# Patient Record
Sex: FEMALE | Employment: OTHER | URBAN - METROPOLITAN AREA
[De-identification: names, ages, dates, MRNs, and addresses within clinical notes are randomized per-mention and may not be internally consistent; named-entity substitution may affect disease eponyms.]

---

## 2018-05-13 ENCOUNTER — OFFICE VISIT (OUTPATIENT)
Dept: URGENT CARE | Facility: CLINIC | Age: 68
End: 2018-05-13
Payer: MEDICARE

## 2018-05-13 VITALS
OXYGEN SATURATION: 96 % | HEIGHT: 64 IN | WEIGHT: 142 LBS | DIASTOLIC BLOOD PRESSURE: 81 MMHG | TEMPERATURE: 97 F | BODY MASS INDEX: 24.24 KG/M2 | RESPIRATION RATE: 16 BRPM | HEART RATE: 72 BPM | SYSTOLIC BLOOD PRESSURE: 127 MMHG

## 2018-05-13 DIAGNOSIS — K04.7 DENTAL ABSCESS: Primary | ICD-10-CM

## 2018-05-13 PROCEDURE — 99203 OFFICE O/P NEW LOW 30 MIN: CPT | Mod: S$GLB,,, | Performed by: NURSE PRACTITIONER

## 2018-05-13 RX ORDER — PANTOPRAZOLE SODIUM 40 MG/1
40 TABLET, DELAYED RELEASE ORAL DAILY
COMMUNITY

## 2018-05-13 RX ORDER — HYDROCODONE BITARTRATE AND ACETAMINOPHEN 5; 325 MG/1; MG/1
1 TABLET ORAL EVERY 4 HOURS PRN
Qty: 20 TABLET | Refills: 0 | Status: SHIPPED | OUTPATIENT
Start: 2018-05-13

## 2018-05-13 RX ORDER — AMOXICILLIN AND CLAVULANATE POTASSIUM 875; 125 MG/1; MG/1
1 TABLET, FILM COATED ORAL 2 TIMES DAILY
Qty: 20 TABLET | Refills: 0 | Status: SHIPPED | OUTPATIENT
Start: 2018-05-13 | End: 2018-05-23

## 2018-05-13 NOTE — PROGRESS NOTES
"Subjective:       Patient ID: Allyssa Jackson is a 68 y.o. female.    Vitals:  height is 5' 4" (1.626 m) and weight is 64.4 kg (142 lb). Her temperature is 97.3 °F (36.3 °C). Her blood pressure is 127/81 and her pulse is 72. Her respiration is 16 and oxygen saturation is 96%.     Chief Complaint: Dental Pain    Pt had a tooth extracted 2 weeks ago. She states that it has been okay until yesterday when she noticed redness and swelling on left side. The swelling persisted into today and has been sore/burning sensation. She is worried of an infection. She finished antibiotic prescribed by her dentist on Monday- amoxicillin after the dental work.  She normally wears a partial denture to this area but hasn't been able to wear it today because of pain.  States that yesterday she noticed that the denture was irritating but kept it in all day.  No fever.  No facial pain or redness.  She leaves to go on a ten day cruise tomorrow.  She states the pain is not severe but more irritated.        Dental Pain    This is a new problem. The current episode started yesterday. The problem has been unchanged. The pain is at a severity of 5/10. Pertinent negatives include no difficulty swallowing, facial pain, fever, oral bleeding, sinus pressure or thermal sensitivity. She has tried NSAIDs (took ibuprofen 600mg this morning) for the symptoms.     Review of Systems   Constitution: Negative for chills, decreased appetite and fever.   HENT: Negative for congestion, ear pain, sinus pressure and sore throat.         Tooth pain   Eyes: Negative for blurred vision.   Cardiovascular: Negative for chest pain.   Respiratory: Negative for shortness of breath.    Skin: Negative for rash.   Musculoskeletal: Negative for back pain and joint pain.   Gastrointestinal: Negative for abdominal pain, diarrhea, nausea and vomiting.   Neurological: Negative for headaches.   Psychiatric/Behavioral: The patient is not nervous/anxious.        Objective:    "   Physical Exam   Constitutional: She is oriented to person, place, and time. She appears well-developed and well-nourished. She is cooperative.  Non-toxic appearance. She does not appear ill. No distress.   HENT:   Head: Normocephalic and atraumatic.   Right Ear: Hearing, tympanic membrane, external ear and ear canal normal.   Left Ear: Hearing, tympanic membrane, external ear and ear canal normal.   Nose: Nose normal. No mucosal edema, rhinorrhea or nasal deformity. No epistaxis. Right sinus exhibits no maxillary sinus tenderness and no frontal sinus tenderness. Left sinus exhibits no maxillary sinus tenderness and no frontal sinus tenderness.   Mouth/Throat: Uvula is midline, oropharynx is clear and moist and mucous membranes are normal. She does not have dentures. No oral lesions. No trismus in the jaw. Normal dentition. Dental abscesses present. No uvula swelling, lacerations or dental caries. No posterior oropharyngeal erythema.       Eyes: Conjunctivae and lids are normal. No scleral icterus.   Sclera clear bilat   Neck: Trachea normal, full passive range of motion without pain and phonation normal. Neck supple.   Cardiovascular: Normal rate, regular rhythm, normal heart sounds and normal pulses.    Pulmonary/Chest: Effort normal and breath sounds normal. No respiratory distress.   Abdominal: Soft. Normal appearance and bowel sounds are normal. She exhibits no distension. There is no tenderness.   Musculoskeletal: Normal range of motion. She exhibits no edema or deformity.   Neurological: She is alert and oriented to person, place, and time. She exhibits normal muscle tone. Coordination normal.   Skin: Skin is warm, dry and intact. She is not diaphoretic. No pallor.   Psychiatric: She has a normal mood and affect. Her speech is normal and behavior is normal. Judgment and thought content normal. Cognition and memory are normal.   Nursing note and vitals reviewed.      Assessment:       1. Dental abscess         Plan:         Dental abscess  -     amoxicillin-clavulanate 875-125mg (AUGMENTIN) 875-125 mg per tablet; Take 1 tablet by mouth 2 (two) times daily.  Dispense: 20 tablet; Refill: 0  -     hydrocodone-acetaminophen 5-325mg (NORCO) 5-325 mg per tablet; Take 1 tablet by mouth every 4 (four) hours as needed for Pain. Drowsiness precautions  Dispense: 20 tablet; Refill: 0      Patient Instructions   Take antibiotic until completion.  Follow up with your dentist when you get home.  Do not put the device back on that tooth until cleared by your dentist.  Norco as needed for severe pain, do not drink alcohol or drive on this medication as it may sedate you.  Ibuprofen as needed for less severe pain.  Take as directed.  Dental Abscess    An abscess is a pocket of pus at the tip of a tooth root in your jaw bone. It is caused by an infection at the root of the tooth. It can cause pain and swelling of the gum, cheek, or jaw. Pain may spread from the tooth to your ear or the area of your jaw on the same side. If the abscess isnt treated, it appears as a bubble or swelling on the gum near the tooth. The pressure that builds in this swelling is the source of the pain. More serious infections cause your face to swell.  An abscess can be caused by a crack in the tooth, a cavity, a gum infection, or a combination of these. Once the pulp of the tooth is exposed, bacteria can spread down the roots to the tip. If the bacteria are not stopped, they can damage the bone and soft tissue, and an abscess can form.  Home care  Follow these guidelines when caring for yourself at home:  · Avoid hot and cold foods and drinks. Your tooth may be sensitive to changes in temperature. Dont chew on the side of the infected tooth.  · If your tooth is chipped or cracked, or if there is a large open cavity, put oil of cloves directly on the tooth to relieve pain. You can buy oil of cloves at drugstores. Some pharmacies carry an over-the-counter  ""toothache kit." This contains a paste that you can put on the exposed tooth to make it less sensitive.  · Put a cold pack on your jaw over the sore area to help reduce pain.  · You may use over-the-counter medicine to ease pain, unless another medicine was prescribed. If you have chronic liver or kidney disease, talk with your healthcare provider before using acetaminophen or ibuprofen. Also talk with your provider if youve had a stomach ulcer or GI bleeding.  · An antibiotic will be prescribed. Take it until finished, even if you are feeling better after a few days.  Follow-up care  Follow up with your dentist or an oral surgeon, or as advised. Once an infection occurs in a tooth, it will continue to be a problem until the infection is drained. This is done through surgery or a root canal. Or you may need to have your tooth pulled.  Call 911  Call 911 if any of these occur:  · Unusual drowsiness  · Headache or stiff neck  · Weakness or fainting  · Difficulty swallowing, breathing, or opening your mouth  · Swollen eyelids  When to seek medical advice  Call your healthcare provider right away if any of these occur:  · Your face becomes more swollen or red  · Pain gets worse or spreads to your neck  · Fever of 100.4º F (38.0º C) or higher, or as directed by your healthcare provider  · Pus drains from the tooth  Date Last Reviewed: 10/1/2016  © 7116-6360 [employer]. [address] [place] [address], [place] [address]. All rights reserved. This information is not intended as a substitute for professional medical care. Always follow your healthcare professional's instructions.              "

## 2018-05-13 NOTE — PATIENT INSTRUCTIONS
"Take antibiotic until completion.  Follow up with your dentist when you get home.  Do not put the device back on that tooth until cleared by your dentist.  Norco as needed for severe pain, do not drink alcohol or drive on this medication as it may sedate you.  Ibuprofen as needed for less severe pain.  Take as directed.  Dental Abscess    An abscess is a pocket of pus at the tip of a tooth root in your jaw bone. It is caused by an infection at the root of the tooth. It can cause pain and swelling of the gum, cheek, or jaw. Pain may spread from the tooth to your ear or the area of your jaw on the same side. If the abscess isnt treated, it appears as a bubble or swelling on the gum near the tooth. The pressure that builds in this swelling is the source of the pain. More serious infections cause your face to swell.  An abscess can be caused by a crack in the tooth, a cavity, a gum infection, or a combination of these. Once the pulp of the tooth is exposed, bacteria can spread down the roots to the tip. If the bacteria are not stopped, they can damage the bone and soft tissue, and an abscess can form.  Home care  Follow these guidelines when caring for yourself at home:  · Avoid hot and cold foods and drinks. Your tooth may be sensitive to changes in temperature. Dont chew on the side of the infected tooth.  · If your tooth is chipped or cracked, or if there is a large open cavity, put oil of cloves directly on the tooth to relieve pain. You can buy oil of cloves at drugstores. Some pharmacies carry an over-the-counter "toothache kit." This contains a paste that you can put on the exposed tooth to make it less sensitive.  · Put a cold pack on your jaw over the sore area to help reduce pain.  · You may use over-the-counter medicine to ease pain, unless another medicine was prescribed. If you have chronic liver or kidney disease, talk with your healthcare provider before using acetaminophen or ibuprofen. Also talk with " your provider if youve had a stomach ulcer or GI bleeding.  · An antibiotic will be prescribed. Take it until finished, even if you are feeling better after a few days.  Follow-up care  Follow up with your dentist or an oral surgeon, or as advised. Once an infection occurs in a tooth, it will continue to be a problem until the infection is drained. This is done through surgery or a root canal. Or you may need to have your tooth pulled.  Call 911  Call 911 if any of these occur:  · Unusual drowsiness  · Headache or stiff neck  · Weakness or fainting  · Difficulty swallowing, breathing, or opening your mouth  · Swollen eyelids  When to seek medical advice  Call your healthcare provider right away if any of these occur:  · Your face becomes more swollen or red  · Pain gets worse or spreads to your neck  · Fever of 100.4º F (38.0º C) or higher, or as directed by your healthcare provider  · Pus drains from the tooth  Date Last Reviewed: 10/1/2016  © 2507-8894 [employer]. [address] [place] [address], [place] [address]. All rights reserved. This information is not intended as a substitute for professional medical care. Always follow your healthcare professional's instructions.